# Patient Record
Sex: MALE | Race: BLACK OR AFRICAN AMERICAN
[De-identification: names, ages, dates, MRNs, and addresses within clinical notes are randomized per-mention and may not be internally consistent; named-entity substitution may affect disease eponyms.]

---

## 2022-01-14 ENCOUNTER — HOSPITAL ENCOUNTER (EMERGENCY)
Dept: HOSPITAL 61 - ER | Age: 27
Discharge: HOME | End: 2022-01-14
Payer: COMMERCIAL

## 2022-01-14 VITALS — WEIGHT: 224.87 LBS | BODY MASS INDEX: 28.86 KG/M2 | HEIGHT: 74 IN

## 2022-01-14 DIAGNOSIS — K04.7: Primary | ICD-10-CM

## 2022-01-14 DIAGNOSIS — K02.9: ICD-10-CM

## 2022-01-14 PROCEDURE — 99284 EMERGENCY DEPT VISIT MOD MDM: CPT

## 2022-01-14 NOTE — PHYS DOC
General Adult


EDM:


Chief Complaint:  DENTAL PROBLEM





HPI:


HPI:





Patient is a 26-year-old male who presents to the emergency department 

complaining of left lower rear molar pain for the past 2 weeks.  Patient 

reported mild pain 2 weeks ago that has progressively become worse rating a 10 

out of 10 pain that he describes as throbbing.  Patient reports he took 650 mg 

of Tylenol at 6:00 this evening with minimal relief in pain.  Patient reports he

has had a feeling in this tooth in the past for similar symptoms however has 

been greater than 10 years ago.  Patient denies taking any other pain 

medications or trying another pain relief nonpharmacological methods.  Patient 

denies numbness or tingling to his mouth or tongue, denies throat pain, denies 

recent fever or chills, denies difficulty swallowing, denies headaches, 

dizziness, visual changes.  Denies nausea, vomiting, or diarrhea.  Patient 

denies other physical complaints or physical concerns.





Review of Systems:


Review of Systems:


14 body systems of review of systems have been reviewed.  See HPI for pertinent 

positives and negative responses, otherwise all other systems are negative, 

nonpertinent or noncontributory.


Constitutional: Negative except as outlined in HPI above.


Skin: Negative except as outlined in HPI above.


Eyes:   Negative except as outlined in HPI above.


HENT: Negative except as outlined in HPI above.


Respiratory:   Negative except as outlined in HPI above.


Cardiovascular:   Negative except as outlined in HPI above.


GI:   Negative except as outlined in HPI above.


:  Negative except as outlined in HPI above.


Musculoskeletal:   Negative except as outlined in HPI above.


Integument:   Negative except as outlined in HPI above.


Neurologic:   Negative except as outlined in HPI above.


Endocrine:   Negative except as outlined in HPI above.


Lymphatic:  Negative except as outlined in HPI above.


Psychiatric:  Negative except as outlined in HPI above.





Heart Score:


C/O Chest Pain:  No


Risk Factors:


Risk Factors:  DM, Current or recent (<one month) smoker, HTN, HLP, family 

history of CAD, obesity.


Risk Scores:


Score 0 - 3:  2.5% MACE over next 6 weeks - Discharge Home


Score 4 - 6:  20.3% MACE over next 6 weeks - Admit for Clinical Observation


Score 7 - 10:  72.7% MACE over next 6 weeks - Early Invasive Strategies





Physical Exam:


PE:





Constitutional: Well developed, well nourished, no acute distress, non-toxic 

appearance.  26-year-old male holding left jaw area with hand otherwise in no 

apparent distress.


HENT: Normocephalic, atraumatic.  Oropharynx moist, pink, no deep tissue 

infectious process appreciated, patient speaking in normal voice tones, no 

drooling, no trismus, marked dental caries, tooth #18 with decay, no purulent 

drainage appreciated, no bleeding.  No lymphadenopathy of the head or neck 

appreciated.


Eyes: Conjunctiva normal, no discharge.


Neck: Normal range of motion, no stridor.


Cardiovascular: No cyanosis appreciated, distal cap refill less than 2 seconds.


Lungs & Thorax: Patient is in no respiratory distress, no audible adventitious 

lung sounds appreciated.


Abdomen: Nontender, no abnormalities noted.


Skin: Warm, dry, no erythema, no rash.  


Back: No tenderness, no deformities.


Extremities: No tenderness, no cyanosis, no clubbing, ROM intact, no edema.  


Neurologic: Alert and oriented X 3, normal motor function, normal sensory f

unction, no focal deficits noted. 


Psychologic: Affect normal, judgement normal, mood normal.





EKG:


EKG:


[]





Radiology/Procedures:


Radiology/Procedures:


[]





Course & Med Decision Making:


Course & Med Decision Making


Pertinent Labs and Imaging studies reviewed. (See chart for details)





26-year-old male, vital signs reviewed, presents emergency room concerning 

dental pain.  Physical examination reveals marked dental caries, there is no 

abscess or drainage appreciated, suspicious for underlying dental infection, 

discussed with patient need to follow-up with dentist soon, will give list of 

dentists on discharge instructions we will treat dentalgia with p.o. pain medic

ation, Pen-V K regimen, discussed this with patient, patient is amenable to ED 

discharge planning.





Discussed with the patient all findings and diagnostic testing as well as the 

need to follow-up with their primary care provider for further evaluation and 

treatment or return to the ED if any new or worsening symptoms.  Strict return 

precautions were also discussed at length, the patient voiced understanding and 

agreement with the discharge planning.  The patient was nontoxic in appearance, 

in no apparent distress, and hemodynamically stable at the time of disposition.





Dragon Disclaimer:


Dragon Disclaimer:


This electronic medical record was generated, in whole or in part, using a voice

 recognition dictation system.





Departure


Departure


Impression:  


   Primary Impression:  


   Dentalgia


   Additional Impressions:  


   Dental infection


   Dental caries


Disposition:  01 HOME / SELF CARE / HOMELESS


Condition:  GOOD


Referrals:  


NO PCP (PCP)


Patient Instructions:  Dental Caries, Dental Pain





Additional Instructions:  


You were seen today in the emergency department for dental pain.  As we 

discussed, I am starting you on antibiotic and pain medication, I am providing a

 list of dentists for you to follow-up with, please choose a dentist and see 

soon.  Please take antibiotics as prescribed.  You may use Tylenol or Motrin for

 ongoing dental discomfort.  You may try using warm salt water swish and spits 

2-3 times a day to aid in pain control.  Return to the emergency department for 

worsening symptoms or other concerns.  Thank you for visiting our Emergency 

Department.  It was a pleasure taking care of you today in the emergency 

department and we appreciate you trusting us with your care. If any additional 

problems come up don't hesitate to return to visit us. Please follow up with 

your primary care provider so they can plan additional care if needed and know 

about the problem that you had. If symptoms worsen come back to the Emergency 

Department. Any concerning symptoms that start such as chest pain, shortness of 

air, weakness or numbness on one side of the body, running high fevers or any 

other concerning symptoms return to the ER.


Scripts


Ibuprofen (IBUPROFEN) 600 Mg Tablet


600 MG PO PRN Q6HRS PRN for INFLAMMATION, #30 TAB 0 Refills


   Prov: JAY ANTUNEZ APRN         1/14/22 


Penicillin V Potassium (PENICILLIN V POTASSIUM) 500 Mg Tablet


1 TAB PO QID for 14 Days, #56 TAB 0 Refills


   Prov: JAY ANTUNEZ APRN         1/14/22











JAY ANTUNEZ       Jan 14, 2022 23:47